# Patient Record
Sex: FEMALE | Race: WHITE | NOT HISPANIC OR LATINO | Employment: FULL TIME | URBAN - NONMETROPOLITAN AREA
[De-identification: names, ages, dates, MRNs, and addresses within clinical notes are randomized per-mention and may not be internally consistent; named-entity substitution may affect disease eponyms.]

---

## 2017-01-03 ENCOUNTER — OFFICE VISIT (OUTPATIENT)
Dept: OBSTETRICS AND GYNECOLOGY | Facility: CLINIC | Age: 32
End: 2017-01-03

## 2017-01-03 VITALS
HEIGHT: 67 IN | SYSTOLIC BLOOD PRESSURE: 100 MMHG | WEIGHT: 197 LBS | BODY MASS INDEX: 30.92 KG/M2 | DIASTOLIC BLOOD PRESSURE: 62 MMHG

## 2017-01-03 DIAGNOSIS — Z01.419 VISIT FOR GYNECOLOGIC EXAMINATION: Primary | ICD-10-CM

## 2017-01-03 DIAGNOSIS — Z30.41 ENCOUNTER FOR SURVEILLANCE OF CONTRACEPTIVE PILLS: ICD-10-CM

## 2017-01-03 PROCEDURE — G0123 SCREEN CERV/VAG THIN LAYER: HCPCS | Performed by: NURSE PRACTITIONER

## 2017-01-03 PROCEDURE — 99395 PREV VISIT EST AGE 18-39: CPT | Performed by: NURSE PRACTITIONER

## 2017-01-03 RX ORDER — NORETHINDRONE ACETATE AND ETHINYL ESTRADIOL, AND FERROUS FUMARATE 1MG-20(24)
1 KIT ORAL DAILY
Qty: 90 CAPSULE | Refills: 3 | Status: SHIPPED | OUTPATIENT
Start: 2017-01-03 | End: 2017-04-10 | Stop reason: CLARIF

## 2017-01-03 RX ORDER — NORETHINDRONE ACETATE AND ETHINYL ESTRADIOL AND FERROUS FUMARATE 1MG-20(24)
KIT ORAL
COMMUNITY
Start: 2014-12-02 | End: 2017-04-10 | Stop reason: CLARIF

## 2017-01-03 NOTE — PATIENT INSTRUCTIONS
Oral Contraception Information  Oral contraceptive pills (OCPs) are medicines taken to prevent pregnancy. OCPs work by preventing the ovaries from releasing eggs. The hormones in OCPs also cause the cervical mucus to thicken, preventing the sperm from entering the uterus. The hormones also cause the uterine lining to become thin, not allowing a fertilized egg to attach to the inside of the uterus. OCPs are highly effective when taken exactly as prescribed. However, OCPs do not prevent sexually transmitted diseases (STDs). Safe sex practices, such as using condoms along with the pill, can help prevent STDs.   Before taking the pill, you may have a physical exam and Pap test. Your health care provider may order blood tests. The health care provider will make sure you are a good candidate for oral contraception. Discuss with your health care provider the possible side effects of the OCP you may be prescribed. When starting an OCP, it can take 2 to 3 months for the body to adjust to the changes in hormone levels in your body.   TYPES OF ORAL CONTRACEPTION  · The combination pill--This pill contains estrogen and progestin (synthetic progesterone) hormones. The combination pill comes in 21-day, 28-day, or 91-day packs. Some types of combination pills are meant to be taken continuously (365-day pills). With 21-day packs, you do not take pills for 7 days after the last pill. With 28-day packs, the pill is taken every day. The last 7 pills are without hormones. Certain types of pills have more than 21 hormone-containing pills. With 91-day packs, the first 84 pills contain both hormones, and the last 7 pills contain no hormones or contain estrogen only.  · The minipill--This pill contains the progesterone hormone only. The pill is taken every day continuously. It is very important to take the pill at the same time each day. The minipill comes in packs of 28 pills. All 28 pills contain the hormone.    ADVANTAGES OF ORAL  CONTRACEPTIVE PILLS  · Decreases premenstrual symptoms.    · Treats menstrual period cramps.    · Regulates the menstrual cycle.    · Decreases a heavy menstrual flow.    · May treat acne, depending on the type of pill.    · Treats abnormal uterine bleeding.    · Treats polycystic ovarian syndrome.    · Treats endometriosis.    · Can be used as emergency contraception.    THINGS THAT CAN MAKE ORAL CONTRACEPTIVE PILLS LESS EFFECTIVE  OCPs can be less effective if:   · You forget to take the pill at the same time every day.    · You have a stomach or intestinal disease that lessens the absorption of the pill.    · You take OCPs with other medicines that make OCPs less effective, such as antibiotics, certain HIV medicines, and some seizure medicines.    · You take  OCPs.    · You forget to restart the pill on day 7, when using the packs of 21 pills.    RISKS ASSOCIATED WITH ORAL CONTRACEPTIVE PILLS   Oral contraceptive pills can sometimes cause side effects, such as:  · Headache.  · Nausea.  · Breast tenderness.  · Irregular bleeding or spotting.  Combination pills are also associated with a small increased risk of:  · Blood clots.  · Heart attack.  · Stroke.     This information is not intended to replace advice given to you by your health care provider. Make sure you discuss any questions you have with your health care provider.     Document Released: 2004 Document Revised: 10/08/2014 Document Reviewed: 2014  Elsevier Interactive Patient Education  [employer]

## 2017-01-03 NOTE — PROGRESS NOTES
Karen Alcazar is a 31 y.o. female is here today as a self referral.    HPI Comments: I'm here to get a pap and physical and need a refill of my oc's.    Gynecologic Exam   The patient's pertinent negatives include no pelvic pain or vaginal discharge. Pertinent negatives include no abdominal pain, back pain, chills, constipation, diarrhea, flank pain, headaches, nausea, rash, sore throat or vomiting.       The following portions of the patient's history were reviewed and updated as appropriate: allergies, current medications, past medical history, past social history, past surgical history and problem list.    Review of Systems   Constitutional: Negative for activity change, appetite change, chills and fatigue.   HENT: Negative for congestion, dental problem, ear pain, hearing loss, nosebleeds, rhinorrhea, sneezing, sore throat and voice change.    Eyes: Negative for discharge, redness, itching and visual disturbance.   Respiratory: Negative for apnea, cough, choking, shortness of breath and wheezing.    Cardiovascular: Negative for chest pain and palpitations.   Gastrointestinal: Negative for abdominal distention, abdominal pain, constipation, diarrhea, nausea and vomiting.   Endocrine: Negative for cold intolerance, heat intolerance and polyuria.   Genitourinary: Negative for difficulty urinating, dyspareunia, flank pain, genital sores, menstrual problem, pelvic pain, vaginal bleeding and vaginal discharge.   Musculoskeletal: Negative for arthralgias, back pain, myalgias and neck pain.   Skin: Negative for pallor and rash.   Allergic/Immunologic: Negative for environmental allergies and food allergies.   Neurological: Negative for dizziness, tremors, seizures, numbness and headaches.   Hematological: Negative for adenopathy. Does not bruise/bleed easily.   Psychiatric/Behavioral: Negative for agitation, decreased concentration, sleep disturbance and suicidal ideas. The patient is not  nervous/anxious.        Objective   Physical Exam   Constitutional: She is oriented to person, place, and time. She appears well-developed and well-nourished. No distress.   HENT:   Head: Normocephalic and atraumatic.   Right Ear: External ear normal.   Left Ear: External ear normal.   Nose: Nose normal.   Mouth/Throat: Oropharynx is clear and moist.   Eyes: EOM are normal. Pupils are equal, round, and reactive to light.   Neck: Normal range of motion. No thyromegaly present.   Cardiovascular: Normal rate, regular rhythm and normal heart sounds.    No murmur heard.  Pulmonary/Chest: Effort normal and breath sounds normal. No respiratory distress. She has no wheezes. Right breast exhibits no inverted nipple and no mass. Left breast exhibits no inverted nipple and no mass.   Bilateral fibrocystic changes with inverted nipples.      Abdominal: Soft. Bowel sounds are normal. There is no tenderness.   Genitourinary: Vagina normal and uterus normal. No breast tenderness. Pelvic exam was performed with patient supine. There is no rash or tenderness on the right labia. There is no rash or tenderness on the left labia. Cervix exhibits no motion tenderness. Right adnexum displays no mass and no tenderness. Left adnexum displays no mass and no tenderness. No bleeding in the vagina. No vaginal discharge found.   Genitourinary Comments: Urethra and urethral meatus normal  Bladder normal no prolapse  Perineum and rectum examined and intact without lesions   Musculoskeletal: Normal range of motion.   Lymphadenopathy:        Right: No inguinal adenopathy present.        Left: No inguinal adenopathy present.   Neurological: She is alert and oriented to person, place, and time. She has normal reflexes.   Skin: Skin is warm and dry.   Psychiatric: She has a normal mood and affect. Her behavior is normal. Judgment and thought content normal.   Nursing note and vitals reviewed.        Assessment/Plan   Carlos was seen today for  gynecologic exam.    Diagnoses and all orders for this visit:    Visit for gynecologic examination     Normal exam Rossy Brent is PCP and does her labs. Breasts inverted which is normal finding for her.   -     Liquid-based Pap Smear, Screening    Encounter for surveillance of contraceptive pills      Discussed the Minastrin has been discontinued and the TayTulla is the new Minastrin. Rx given along with coupon.   -     TAYTULLA 1-20 MG-MCG(24) capsule; Take 1 tablet by mouth Daily.    BMI 30.0-30.9,adult      Pt will start watching her diet and exercise but is taking a certification test for work presently and does not have the time presently.     Once that is over she will start.

## 2017-01-03 NOTE — MR AVS SNAPSHOT
Carlos Alcazar   1/3/2017 8:00 AM   Office Visit    Dept Phone:  [phone]   Encounter #:  [id number]    Provider:  Mary R Carrell, APRN   Department:  [facility]                Your Full Care Plan              Today's Medication Changes          These changes are accurate as of: 1/3/17  8:59 AM.  If you have any questions, ask your nurse or doctor.               Medication(s)that have changed:     * MINASTRIN 24 FE 1-20 MG-MCG(24) chewable tablet   Generic drug:  Norethin Ace-Eth Estrad-FE   Take 1 tablet by mouth daily.   What changed:  Another medication with the same name was added. Make sure you understand how and when to take each.   Changed by:  Mary R Carrell, APRN       * TAYTULLA 1-20 MG-MCG(24) capsule   Generic drug:  Norethin Ace-Eth Estrad-FE   Take 1 tablet by mouth Daily.   What changed:  You were already taking a medication with the same name, and this prescription was added. Make sure you understand how and when to take each.   Changed by:  Mary R Carrell, APRN       * Notice:  This list has 2 medication(s) that are the same as other medications prescribed for you. Read the directions carefully, and ask your doctor or other care provider to review them with you.         Where to Get Your Medications      These medications were sent to [facility] [address] [place] [address], [place] - [address] [facility] [address] [facility] [phone] [facility] [phone]   [address] , [facility] [address]    Hours:  24-hours Phone:  [phone]     TAYTULLA 1-20 MG-MCG(24) capsule                  Your Updated Medication List          This list is accurate as of: 1/3/17  8:59 AM.  Always use your most recent med list.                * MINASTRIN 24 FE 1-20 MG-MCG(24) chewable tablet   Generic drug:  Norethin Ace-Eth Estrad-FE       * TAYTULLA 1-20 MG-MCG(24) capsule   Generic drug:  Norethin Ace-Eth Estrad-FE   Take 1 tablet by mouth Daily.  "      * Notice:  This list has 2 medication(s) that are the same as other medications prescribed for you. Read the directions carefully, and ask your doctor or other care provider to review them with you.            You Were Diagnosed With        Codes Comments    Visit for gynecologic examination    -  Primary ICD-10-CM: Z01.419  ICD-9-CM: V72.31     Encounter for surveillance of contraceptive pills     ICD-10-CM: Z30.41  ICD-9-CM: V25.41     BMI 30.0-30.9,adult     ICD-10-CM: Z68.30  ICD-9-CM: V85.30       Instructions     None    Patient Instructions History      Upcoming Appointments     Visit Type Date Time Department    PHYSICAL 1/3/2017  8:00 AM JOSE VILLAGOMEZ      SnapUp Signup     [facility] allows you to send messages to your doctor, view your test results, renew your prescriptions, schedule appointments, and more. To sign up, go to [employer] and click on the Sign Up Now link in the New User? box. Enter your SnapUp Activation Code exactly as it appears below along with the last four digits of your Social Security Number and your Date of Birth () to complete the sign-up process. If you do not sign up before the expiration date, you must request a new code.    SnapUp Activation Code: [email or web address]  Expires: 2017  8:59 AM    If you have questions, you can email [email or web address] or call [phone] to talk to our SnapUp staff. Remember, SnapUp is NOT to be used for urgent needs. For medical emergencies, dial 911.               Other Info from Your Visit           Allergies     No Known Allergies      Reason for Visit     Gynecologic Exam I am here for a yearly exam.  No problems.      Vital Signs     Blood Pressure Height Weight Last Menstrual Period Body Mass Index Smoking Status    100/62 (BP Location: Left arm, Patient Position: Sitting, Cuff Size: Adult) 67\" (170.2 cm) 197 lb (89.4 kg) 2016 (Approximate) 30.85 kg/m2 Never Smoker    "   Problems and Diagnoses Noted     Visit for gynecologic examination    -  Primary    Surveillance for birth control, oral contraceptives        Body mass index 30.0-30.9, adult

## 2017-01-12 LAB
GEN CATEG CVX/VAG CYTO-IMP: NORMAL
HPV REFLEX?: NORMAL
LAB AP CASE REPORT: NORMAL
LAB AP GYN ADDITIONAL INFORMATION: NORMAL
LAB AP GYN OTHER FINDINGS: NORMAL
Lab: NORMAL
PATH INTERP SPEC-IMP: NORMAL
STAT OF ADQ CVX/VAG CYTO-IMP: NORMAL

## 2017-04-03 ENCOUNTER — TELEPHONE (OUTPATIENT)
Dept: OBSTETRICS AND GYNECOLOGY | Facility: CLINIC | Age: 32
End: 2017-04-03

## 2017-04-03 NOTE — TELEPHONE ENCOUNTER
Pt now lives in [facility] and just had her yearly with Mary Carrell. She wants to get her OCPs transferred to her new pharmacy. Pt informed to contact her new pharmacy and they would be able to pull refills from the pharmacy we already rx'd to.

## 2017-04-10 RX ORDER — NORETHINDRONE ACETATE AND ETHINYL ESTRADIOL 1MG-20(21)
1 KIT ORAL DAILY
Qty: 28 TABLET | Refills: 12 | Status: SHIPPED | OUTPATIENT
Start: 2017-04-10 | End: 2017-04-11 | Stop reason: SDUPTHER

## 2017-04-11 RX ORDER — NORETHINDRONE ACETATE AND ETHINYL ESTRADIOL 1MG-20(21)
1 KIT ORAL DAILY
Qty: 28 TABLET | Refills: 11 | Status: SHIPPED | OUTPATIENT
Start: 2017-04-11 | End: 2018-04-11

## 2017-04-11 RX ORDER — NORETHINDRONE ACETATE AND ETHINYL ESTRADIOL 1MG-20(21)
1 KIT ORAL DAILY
Qty: 28 TABLET | Refills: 12 | Status: SHIPPED | OUTPATIENT
Start: 2017-04-11 | End: 2018-04-11

## 2017-11-08 DIAGNOSIS — R53.83 FATIGUE, UNSPECIFIED TYPE: ICD-10-CM

## 2017-11-08 DIAGNOSIS — Z00.00 ROUTINE GENERAL MEDICAL EXAMINATION AT A HEALTH CARE FACILITY: Primary | ICD-10-CM

## 2017-11-08 DIAGNOSIS — R31.9 ESSENTIAL HEMATURIA: ICD-10-CM

## 2017-11-08 DIAGNOSIS — N28.9 RENAL INSUFFICIENCY: ICD-10-CM
